# Patient Record
(demographics unavailable — no encounter records)

---

## 2024-11-19 NOTE — PHYSICAL EXAM
[Alert] : alert [No Acute Distress] : no acute distress [Normal Sclera/Conjunctiva] : normal sclera/conjunctiva [EOMI] : extra ocular movement intact [No Proptosis] : no proptosis [Normal Lips/Gums] : the lips and gums were normal [Normal Oropharynx] : the oropharynx was normal [No Respiratory Distress] : no respiratory distress [No Accessory Muscle Use] : no accessory muscle use [Normal Rate] : heart rate was normal [Normal Anterior Cervical Nodes] : no anterior cervical lymphadenopathy [No Spinal Tenderness] : no spinal tenderness [Spine Straight] : spine straight [No Stigmata of Cushings Syndrome] : no stigmata of Cushings Syndrome [Normal Gait] : normal gait [Normal Strength/Tone] : muscle strength and tone were normal [No Tremors] : no tremors [Oriented x3] : oriented to person, place, and time [Normal Insight/Judgement] : insight and judgment were intact [Acanthosis Nigricans] : no acanthosis nigricans [de-identified] : 2-3 fingerbreadths ribs to pelvis

## 2024-11-19 NOTE — HISTORY OF PRESENT ILLNESS
[FreeTextEntry1] : Patient presents with daughter. History obtained from patient and daughter. Language:  service: Declines. Pt prefers daughter translates.  Patient was diagnosed via initial screening DEXA scan.  Past osteoporosis medications: declines  Post-menopausal, age 53. No HRT. Symptoms: no hot flashes, no night sweats, no insomnia, no mood swings Amenorrhea during reproductive years: denies Mammogram: UTD  History of breast cancer: denies. No chemo, RT. Fhx: denies  Active lifestyle.  Exercise: denies Fractures: denies Frequent falls: 1 fall in 6 months. Previously did PT for balance Assistive device: denies wheelchair walker cane. Fhx: osteoporosis in mom, no parental hip fracture.  Diet: regular Calcium: oat milk or almond milk daily, occ cheese, daily yogurt, and daily green, leafy vegetables. Supplements with Centrum Silver Women's 50+. H/o polyps on colonoscopy Denies history of kidney stones, excessive alcohol intake, excessive soda intake, celiac disease, malabsorption, nutritional deficiencies, GIB, stomach ulcers.  Smoking hx: denies  PMHx/PSHx:  OA - pain to fingers and b/l knees Pre-DM - metformin 500 mg QD, managed by PCP HTN - lisinopril  HLD - recently told of HLD, not on medication, will have repeat testing with PCP Denies Paget's Dz, hyperparathyroidism, diabetes, CVD, blood clots, and chronic steroid use.  Sees DDS every 6 months. No major dental work planned.  11/19/2024 Pt returns for a follow up visit. No interval changes.

## 2024-11-19 NOTE — ASSESSMENT
[FreeTextEntry1] : Initial consultation for osteoporosis, 07/19/2024.  Here for follow up.  Post-menopausal, age 53. No HRT. No VMS. 1 fall in 6 months but no fx. Fh/o osteoporosis in mom, no parental hip fracture. No other obvious risk factors.  Initial screening BMD 06/17/2024 osteoporosis in the spine -2.9, osteopenia in the total hip -1.2, osteopenia in the femoral neck: -2.0. BMD results were discussed with the patient.  Given the patient's history and BMD, the patient is at an increased risk of future fracture. Options of medical therapy were discussed in detail including risks and benefits.  Initial work up shows no secondary causes. Baseline CTx 256 in July 2024. Pt elects IV Reclast. Order placed.  I discussed that weight bearing exercises, cardiovascular activities, and diet are beneficial to overall health, but are not adequate for bone density increase or alternative to osteoporosis medication.  I recommend supplementing with no more than 500 mg of Ca and 1000 IU of Vit D3 QD. Pt can continue with Centrum Silver without any additional Ca/Vit D at this time. 25 Vit D normal, 35, in 2/2024.  Follow up in 4 months.  Repeat BMD in 1 year after starting therapy.  Larry MS, Vimal SL, Qamar KL, Madiha EM, Carlos KG,  AJ, Lorena ES. The clinician's guide to prevention and treatment of osteoporosis. Osteoporosis Int. 2022 Oct;33(10):5358-7316.

## 2024-11-19 NOTE — PROCEDURE
[FreeTextEntry1] : Bone Mineral Density: 06/17/2024  Indication: initial screening Spine (L1, L2, L3, L4): -2.9, osteoporosis Total hip: -1.2, osteopenia Femoral neck: -2.0, osteopenia Proximal radius: not measured

## 2024-11-19 NOTE — REASON FOR VISIT
[Consultation] : a consultation visit [Osteoporosis] : osteoporosis [FreeTextEntry2] : Dr. Ford (PCP)

## 2025-01-15 NOTE — HISTORY OF PRESENT ILLNESS
[Denies] : Denies [No] : No [N/A] : N/A [Yes] : Yes [Informed consent documented in EHR.] : Informed consent documented in EHR. [Left upper extremity] : Left upper extremity [24g] : 24g [Start Time: ___] : Medication Start Time: [unfilled] [End Time: ___] : Medication End Time: [unfilled] [Medication Name: ___] : Medication Name: [unfilled] [Total Amount Administered: ___] : Total Amount Administered: [unfilled] [IV discontinued. Intact. No signs or symptoms of IV complications noted. Time: ___] : IV discontinued. Intact. No signs or symptoms of IV complications noted. Time: [unfilled] [Patient  instructed to seek medical attention with signs and symptoms of adverse effects] : Patient  instructed to seek medical attention with signs and symptoms of adverse effects [Patient left unit in no acute distress] : Patient left unit in no acute distress [Medications administered as ordered and tolerated well.] : Medications administered as ordered and tolerated well. [de-identified] : left forearm [de-identified] : Patient ambulated onto the unit for scheduled Zoledronic Acid infusion in NAD, accompanied by daughter. Discharge instructions reviewed with patient and daughter at the chairside, they both verbalized understanding. Patient tolerated infusion well, VSS. Patient ambulated off the unit in NAD upon medication completion.

## 2025-02-13 NOTE — HISTORY OF PRESENT ILLNESS
[FreeTextEntry1] : med refiils [de-identified] : 59 year old female with PMH of prediabetes, osteoporosis presents for medication refills, Mammo referral. Pt also complains of left shoulder pain radiating down to her fingers, with associated pin and needle sensation.

## 2025-02-13 NOTE — INTERPRETER SERVICES
[Language Line ] : provided by Language Line   [Interpreters_IDNumber] : [id number] [TWNoteComboBox1] : Turkish

## 2025-02-13 NOTE — PHYSICAL EXAM
[Normal] : normal rate, regular rhythm, normal S1 and S2 and no murmur heard [No Joint Swelling] : no joint swelling [Grossly Normal Strength/Tone] : grossly normal strength/tone [No Rash] : no rash [de-identified] : + left cervical paraspinal tenderness

## 2025-02-13 NOTE — HISTORY OF PRESENT ILLNESS
[FreeTextEntry1] : med refiils [de-identified] : 59 year old female with PMH of prediabetes, osteoporosis presents for medication refills, Mammo referral. Pt also complains of left shoulder pain radiating down to her fingers, with associated pin and needle sensation.

## 2025-02-13 NOTE — HISTORY OF PRESENT ILLNESS
[FreeTextEntry1] : med refiils [de-identified] : 59 year old female with PMH of prediabetes, osteoporosis presents for medication refills, Mammo referral. Pt also complains of left shoulder pain radiating down to her fingers, with associated pin and needle sensation.

## 2025-02-13 NOTE — REVIEW OF SYSTEMS
[Negative] : Integumentary [FreeTextEntry9] : + shoulder pain radiating down to left fingers and left side of neck

## 2025-02-13 NOTE — PHYSICAL EXAM
[Normal] : normal rate, regular rhythm, normal S1 and S2 and no murmur heard [No Joint Swelling] : no joint swelling [Grossly Normal Strength/Tone] : grossly normal strength/tone [No Rash] : no rash [de-identified] : + left cervical paraspinal tenderness

## 2025-02-13 NOTE — PHYSICAL EXAM
[Normal] : normal rate, regular rhythm, normal S1 and S2 and no murmur heard [No Joint Swelling] : no joint swelling [Grossly Normal Strength/Tone] : grossly normal strength/tone [No Rash] : no rash [de-identified] : + left cervical paraspinal tenderness

## 2025-02-13 NOTE — INTERPRETER SERVICES
[Language Line ] : provided by Language Line   [Interpreters_IDNumber] : [id number] [TWNoteComboBox1] : Gibraltarian

## 2025-02-13 NOTE — INTERPRETER SERVICES
[Language Line ] : provided by Language [employer]   [Interpreters_IDNumber] : [id number] [TWNoteComboBox1] : Malawian

## 2025-04-11 NOTE — ASSESSMENT
[FreeTextEntry1] : Initial consultation for osteoporosis, 07/19/2024.  Here for follow up.  Post-menopausal, age 53. No HRT. No VMS. No fragility fx. Fh/o osteoporosis in mom, no parental hip fracture. No other obvious risk factors.  BMD 06/17/2024 osteoporosis in the spine -2.9, osteopenia in the total hip -1.2, osteopenia in the femoral neck: -2.0. BMD results were discussed with the patient.  Given the patient's history and BMD, the patient is at an increased risk of future fracture. Options of medical therapy were discussed in detail including risks and benefits.  Initial work up shows no secondary causes. Baseline CTx 256 in July 2024. S/p IV Reclast 1/2025. +APR. No ONJ or AFF. Repeat labs today and DEXA 1/2026.  I discussed that weight bearing exercises, cardiovascular activities, and diet are beneficial to overall health, but are not adequate for bone density increase or alternative to osteoporosis medication.  I recommend supplementing with no more than 500 mg of Ca and 1000 IU of Vit D3 QD. Pt can continue with Centrum Silver without any additional Ca/Vit D at this time. 25 Vit D normal, 35, in 2/2024.  Labs today including CMP, CTx. Script given for repeat DEXA 1/2026. Follow up 1/2026 (virtual OK).

## 2025-04-11 NOTE — HISTORY OF PRESENT ILLNESS
[FreeTextEntry1] : Patient presents with daughter. History obtained from patient and daughter. Language:  service: Declines. Pt prefers daughter translates.  Patient was diagnosed via initial screening DEXA 6/2024.  Past osteoporosis medications: declines  Post-menopausal, age 53. No HRT. Symptoms: no hot flashes, no night sweats, no insomnia, no mood swings Amenorrhea during reproductive years: denies Mammogram: UTD  History of breast cancer: denies. No chemo, RT. Fhx: denies  Active lifestyle.  Exercise: denies Fractures: denies Frequent falls: Completed PT for balance Assistive device: denies Fhx: osteoporosis in mom, no parental hip fracture.  Calcium: oat milk or almond milk daily, occ cheese, daily yogurt, and daily green, leafy vegetables. Supplements with Centrum Silver Women's 50+. H/o polyps on colonoscopy Denies history of kidney stones, excessive alcohol intake, excessive soda intake, celiac disease, malabsorption, nutritional deficiencies, GIB, stomach ulcers.  Smoking hx: denies  PMHx/PSHx:  OA - pain to fingers and b/l knees Pre-DM - metformin 500 mg QD, managed by PCP HTN - lisinopril  HLD - recently told of HLD, not on medication, will have repeat testing with PCP Denies Paget's Dz, hyperparathyroidism, diabetes, CVD, blood clots, and chronic steroid use.  04/11/2025 The patient had IV Reclast 1/15/2025. +APR x2-3 days. Denies jaw pain. Due to see DDS, no ONJ, not planning any major dental work. Denies thigh pain. No interval falls or fracture. No interval surgery, hospitalizations or new medications.

## 2025-04-11 NOTE — REASON FOR VISIT
[Follow - Up] : a follow-up visit [Osteoporosis] : osteoporosis [Family Member] : family member [FreeTextEntry2] : Dr. Ford (PCP)

## 2025-04-11 NOTE — PHYSICAL EXAM
[Alert] : alert [No Acute Distress] : no acute distress [Normal Sclera/Conjunctiva] : normal sclera/conjunctiva [EOMI] : extra ocular movement intact [No Proptosis] : no proptosis [Normal Lips/Gums] : the lips and gums were normal [Normal Oropharynx] : the oropharynx was normal [No Respiratory Distress] : no respiratory distress [No Accessory Muscle Use] : no accessory muscle use [Normal Rate] : heart rate was normal [Normal Anterior Cervical Nodes] : no anterior cervical lymphadenopathy [No Spinal Tenderness] : no spinal tenderness [Spine Straight] : spine straight [No Stigmata of Cushings Syndrome] : no stigmata of Cushings Syndrome [Normal Gait] : normal gait [Normal Strength/Tone] : muscle strength and tone were normal [No Tremors] : no tremors [Oriented x3] : oriented to person, place, and time [Normal Insight/Judgement] : insight and judgment were intact [Acanthosis Nigricans] : no acanthosis nigricans [de-identified] : 2-3 fingerbreadths ribs to pelvis

## 2025-05-08 NOTE — PHYSICAL EXAM
[No Lymphadenopathy] : no lymphadenopathy [Supple] : supple [Normal] : normal rate, regular rhythm, normal S1 and S2 and no murmur heard [No Varicosities] : no varicosities [Pedal Pulses Present] : the pedal pulses are present [No Edema] : there was no peripheral edema [Soft] : abdomen soft [Non Tender] : non-tender [Non-distended] : non-distended [Normal Anterior Cervical Nodes] : no anterior cervical lymphadenopathy [No CVA Tenderness] : no CVA  tenderness [No Spinal Tenderness] : no spinal tenderness [No Joint Swelling] : no joint swelling [Grossly Normal Strength/Tone] : grossly normal strength/tone [No Rash] : no rash [No Focal Deficits] : no focal deficits [Normal Gait] : normal gait

## 2025-05-08 NOTE — HISTORY OF PRESENT ILLNESS
[FreeTextEntry1] : CPE [de-identified] : 59 year old female with PMH of HTN, osteoporosis on Reclast (01/25/25), prediabetes presents for CPE. Pt has no acute complaint at this time.

## 2025-05-08 NOTE — HEALTH RISK ASSESSMENT
[Very Good] : ~his/her~  mood as very good [No] : No [No falls in past year] : Patient reported no falls in the past year [0] : 2) Feeling down, depressed, or hopeless: Not at all (0) [PHQ-2 Negative - No further assessment needed] : PHQ-2 Negative - No further assessment needed [Never] : Never [NO] : No [Patient reported mammogram was normal] : Patient reported mammogram was normal [Patient reported PAP Smear was normal] : Patient reported PAP Smear was normal [Patient reported colonoscopy was abnormal] : Patient reported colonoscopy was abnormal [HIV Test offered] : HIV Test offered [Hepatitis C test offered] : Hepatitis C test offered [Change in mental status noted] : No change in mental status noted [Language] : denies difficulty with language [None] : None [With Family] : lives with family [Employed] : employed [] :  [Feels Safe at Home] : Feels safe at home [Fully functional (bathing, dressing, toileting, transferring, walking, feeding)] : Fully functional (bathing, dressing, toileting, transferring, walking, feeding) [Fully functional (using the telephone, shopping, preparing meals, housekeeping, doing laundry, using] : Fully functional and needs no help or supervision to perform IADLs (using the telephone, shopping, preparing meals, housekeeping, doing laundry, using transportation, managing medications and managing finances) [Reports changes in hearing] : Reports no changes in hearing [Reports changes in vision] : Reports no changes in vision [Reports changes in dental health] : Reports no changes in dental health [MammogramDate] : 03/2025 [PapSmearDate] : 05/2024 [PapSmearComments] : atrophic pattern, HPV neg  [ColonoscopyDate] : 09/2024 [ColonoscopyComments] : Multiple polyps, repeat in 2027

## 2025-05-08 NOTE — HISTORY OF PRESENT ILLNESS
[FreeTextEntry1] : CPE [de-identified] : 59 year old female with PMH of HTN, osteoporosis on Reclast (01/25/25), prediabetes presents for CPE. Pt has no acute complaint at this time.